# Patient Record
Sex: FEMALE | Race: WHITE
[De-identification: names, ages, dates, MRNs, and addresses within clinical notes are randomized per-mention and may not be internally consistent; named-entity substitution may affect disease eponyms.]

---

## 2018-10-24 ENCOUNTER — HOSPITAL ENCOUNTER (OUTPATIENT)
Dept: HOSPITAL 92 - BICMAMMO | Age: 53
Discharge: HOME | End: 2018-10-24
Attending: OBSTETRICS & GYNECOLOGY
Payer: COMMERCIAL

## 2018-10-24 DIAGNOSIS — Z12.31: Primary | ICD-10-CM

## 2018-10-24 DIAGNOSIS — Z13.820: ICD-10-CM

## 2018-10-24 DIAGNOSIS — Z80.3: ICD-10-CM

## 2018-10-24 PROCEDURE — 77067 SCR MAMMO BI INCL CAD: CPT

## 2018-10-24 PROCEDURE — 77080 DXA BONE DENSITY AXIAL: CPT

## 2018-10-24 PROCEDURE — 77063 BREAST TOMOSYNTHESIS BI: CPT

## 2018-10-24 NOTE — BD
DEXA BONE DENSITY EXAM:

 

COMPARISON: 

None.

 

HISTORY: 

A 52-year-old postmenopausal female for screening.

 

FINDINGS: 

 

Lumbar Spine:       BMD (g/cm2)

    L1                0.984              T-Score: -0.1

    L2                0.958              T-Score: -0.6

    L3                1.189              T-Score:  1.0

    L4                1.139              T-Score:  0.7

 

    L1-L4             1.080              T-Score:  0.3

 

Femoral Neck:         0.756              T-Score: -0.8

 

Total Femur:          0.916              T-Score: -0.2

 

Impression:

Normal bone mineral density.  This patient has a 10-year WHO fracture risk of a major osteoporotic fr
acture of 9.8% and of a hip fracture 0.5%.

 

POS: [facility]